# Patient Record
(demographics unavailable — no encounter records)

---

## 2025-04-08 NOTE — HISTORY OF PRESENT ILLNESS
[FreeTextEntry1] : follow up chronic medical conditions.  [de-identified] : Mr. LEONIDAS DOBSON is a 36 year male comes to the office for follow up chronic medical conditions.  Patient also complains of GERD, History of blood in stool (asymptomatic at time of visit) history of testicular discomfort ( asymptomatic at time of visit) Patient denies fever, cough SOB. No other complaints at this time.

## 2025-04-08 NOTE — HISTORY OF PRESENT ILLNESS
[FreeTextEntry1] : follow up chronic medical conditions.  [de-identified] : Mr. LEONIDAS DOBSON is a 36 year male comes to the office for follow up chronic medical conditions.  Patient also complains of GERD, History of blood in stool (asymptomatic at time of visit) history of testicular discomfort ( asymptomatic at time of visit) Patient denies fever, cough SOB. No other complaints at this time.

## 2025-04-08 NOTE — PLAN
[FreeTextEntry1] : GERD and history of blood in stool, patient asymptomatic at time of visit. Patient referred to GI doctor  In regards to patient's acid reflux, patient advised to avoid provocative foods such as citrus, alcohol, coffee, chocolate, mints. Patient also advised to eat smaller meals, no eating 3 hours prior to bedtime and elevate head of the bed prior to sleep.   skin rash on scalp- patient referred to Dermatologist  History of testicular discomfort- patient referred to Urologist   Prior to appointment and during encounter with patient extensive medical records were reviewed including but not limited to, Hospital records, out patient records, laboratory data and microbiology data    Total encounter total time 30 mins >50% of time spent counseling/coordinating care  Counseling included abnormal lab results, differential diagnoses, treatment options, risks and benefits, lifestyle changes, current condition, medications, and dose adjustments.  The patient was interactive, attentive, asked questions, and verbalized understanding

## 2025-04-16 NOTE — HISTORY OF PRESENT ILLNESS
[FreeTextEntry1] : Pt with c/o skin lesion "down there" x 1 month. Denies itching, frequency, urgency, dysuria, weak stream, blood in urine. Is sexually active with long term partner without condom. H/ STD he does not recall which one.  circ Radhaan, social alcohol, single, 2 chiildren  MEDICINE

## 2025-04-16 NOTE — ASSESSMENT
[FreeTextEntry1] : Condyloma  @ lesions to scrotum look like flat condyloma. I informed pt will trial Imiquimod. Rx: Imiquimod as directed three times per week. Apply in evening, wash off in AM Discussed safe sex RTO in 4 weeks. If no improvement, will refer to dermatology.

## 2025-04-16 NOTE — HISTORY OF PRESENT ILLNESS
[FreeTextEntry1] : Pt with c/o skin lesion "down there" x 1 month. Denies itching, frequency, urgency, dysuria, weak stream, blood in urine. Is sexually active with long term partner without condom. H/ STD he does not recall which one.  circ Radhaan, social alcohol, single, 2 chiildren

## 2025-04-16 NOTE — PHYSICAL EXAM
[General Appearance - Well Developed] : well developed [General Appearance - Well Nourished] : well nourished [Normal Appearance] : normal appearance [Well Groomed] : well groomed [General Appearance - In No Acute Distress] : no acute distress [Respiration, Rhythm And Depth] : normal respiratory rhythm and effort [Exaggerated Use Of Accessory Muscles For Inspiration] : no accessory muscle use [Urethral Meatus] : meatus normal [Penis Abnormality] : normal circumcised penis [Epididymis] : the epididymides were normal [Testes Tenderness] : no tenderness of the testes [Testes Mass (___cm)] : there were no testicular masses [Normal Station and Gait] : the gait and station were normal for the patient's age [Skin Color & Pigmentation] : normal skin color and pigmentation [] : no rash [No Focal Deficits] : no focal deficits [Oriented To Time, Place, And Person] : oriented to person, place, and time [Affect] : the affect was normal [Mood] : the mood was normal [Not Anxious] : not anxious [de-identified] : 2 flat lesions to anterioe scrotum, look like condyloma

## 2025-04-16 NOTE — PHYSICAL EXAM
[General Appearance - Well Developed] : well developed [General Appearance - Well Nourished] : well nourished [Normal Appearance] : normal appearance [Well Groomed] : well groomed [General Appearance - In No Acute Distress] : no acute distress [Respiration, Rhythm And Depth] : normal respiratory rhythm and effort [Exaggerated Use Of Accessory Muscles For Inspiration] : no accessory muscle use [Urethral Meatus] : meatus normal [Penis Abnormality] : normal circumcised penis [Epididymis] : the epididymides were normal [Testes Tenderness] : no tenderness of the testes [Testes Mass (___cm)] : there were no testicular masses [Normal Station and Gait] : the gait and station were normal for the patient's age [Skin Color & Pigmentation] : normal skin color and pigmentation [] : no rash [No Focal Deficits] : no focal deficits [Oriented To Time, Place, And Person] : oriented to person, place, and time [Affect] : the affect was normal [Mood] : the mood was normal [Not Anxious] : not anxious [de-identified] : 2 flat lesions to anterioe scrotum, look like condyloma